# Patient Record
Sex: MALE | Race: ASIAN | Employment: FULL TIME | URBAN - METROPOLITAN AREA
[De-identification: names, ages, dates, MRNs, and addresses within clinical notes are randomized per-mention and may not be internally consistent; named-entity substitution may affect disease eponyms.]

---

## 2023-04-16 ENCOUNTER — HOSPITAL ENCOUNTER (OUTPATIENT)
Age: 53
Setting detail: OUTPATIENT SURGERY
Discharge: HOME | End: 2023-04-16
Attending: OPHTHALMOLOGY | Admitting: OPHTHALMOLOGY
Payer: COMMERCIAL

## 2023-04-16 VITALS
OXYGEN SATURATION: 97 % | RESPIRATION RATE: 16 BRPM | TEMPERATURE: 98.5 F | HEART RATE: 102 BPM | SYSTOLIC BLOOD PRESSURE: 157 MMHG | DIASTOLIC BLOOD PRESSURE: 81 MMHG | BODY MASS INDEX: 25.92 KG/M2 | WEIGHT: 175 LBS | HEIGHT: 69 IN

## 2023-04-16 PROCEDURE — 7100000010 HC PHASE II RECOVERY - FIRST 15 MIN: Performed by: OPHTHALMOLOGY

## 2023-04-16 PROCEDURE — 2580000003 HC RX 258: Performed by: ANESTHESIOLOGY

## 2023-04-16 PROCEDURE — 6370000000 HC RX 637 (ALT 250 FOR IP): Performed by: OPHTHALMOLOGY

## 2023-04-16 PROCEDURE — 3600000014 HC SURGERY LEVEL 4 ADDTL 15MIN: Performed by: OPHTHALMOLOGY

## 2023-04-16 PROCEDURE — 3600000004 HC SURGERY LEVEL 4 BASE: Performed by: OPHTHALMOLOGY

## 2023-04-16 PROCEDURE — 6360000002 HC RX W HCPCS: Performed by: ANESTHESIOLOGY

## 2023-04-16 PROCEDURE — 2720000010 HC SURG SUPPLY STERILE: Performed by: OPHTHALMOLOGY

## 2023-04-16 PROCEDURE — 7100000000 HC PACU RECOVERY - FIRST 15 MIN: Performed by: OPHTHALMOLOGY

## 2023-04-16 PROCEDURE — 3700000001 HC ADD 15 MINUTES (ANESTHESIA): Performed by: OPHTHALMOLOGY

## 2023-04-16 PROCEDURE — 7100000001 HC PACU RECOVERY - ADDTL 15 MIN: Performed by: OPHTHALMOLOGY

## 2023-04-16 PROCEDURE — 2500000003 HC RX 250 WO HCPCS: Performed by: OPHTHALMOLOGY

## 2023-04-16 PROCEDURE — 2709999900 HC NON-CHARGEABLE SUPPLY: Performed by: OPHTHALMOLOGY

## 2023-04-16 PROCEDURE — C1784 OCULAR DEV, INTRAOP, DET RET: HCPCS | Performed by: OPHTHALMOLOGY

## 2023-04-16 PROCEDURE — 7100000011 HC PHASE II RECOVERY - ADDTL 15 MIN: Performed by: OPHTHALMOLOGY

## 2023-04-16 PROCEDURE — 6360000002 HC RX W HCPCS: Performed by: OPHTHALMOLOGY

## 2023-04-16 PROCEDURE — 3700000000 HC ANESTHESIA ATTENDED CARE: Performed by: OPHTHALMOLOGY

## 2023-04-16 DEVICE — STRIP SCLER W3.5XL125MM THK0.75MM SIL SLD STYL 41/S2970: Type: IMPLANTABLE DEVICE | Site: EYE | Status: FUNCTIONAL

## 2023-04-16 RX ORDER — SODIUM CHLORIDE 9 MG/ML
INJECTION, SOLUTION INTRAVENOUS PRN
Status: DISCONTINUED | OUTPATIENT
Start: 2023-04-16 | End: 2023-04-16 | Stop reason: HOSPADM

## 2023-04-16 RX ORDER — PROCHLORPERAZINE EDISYLATE 5 MG/ML
5 INJECTION INTRAMUSCULAR; INTRAVENOUS
Status: DISCONTINUED | OUTPATIENT
Start: 2023-04-16 | End: 2023-04-16 | Stop reason: HOSPADM

## 2023-04-16 RX ORDER — SODIUM CHLORIDE 0.9 % (FLUSH) 0.9 %
5-40 SYRINGE (ML) INJECTION PRN
Status: DISCONTINUED | OUTPATIENT
Start: 2023-04-16 | End: 2023-04-16 | Stop reason: HOSPADM

## 2023-04-16 RX ORDER — PHENYLEPHRINE HCL 2.5 %
1 DROPS OPHTHALMIC (EYE)
Status: COMPLETED | OUTPATIENT
Start: 2023-04-16 | End: 2023-04-16

## 2023-04-16 RX ORDER — OXYCODONE HYDROCHLORIDE 5 MG/1
5 TABLET ORAL
Status: DISCONTINUED | OUTPATIENT
Start: 2023-04-16 | End: 2023-04-16 | Stop reason: HOSPADM

## 2023-04-16 RX ORDER — SODIUM CHLORIDE, SODIUM LACTATE, POTASSIUM CHLORIDE, CALCIUM CHLORIDE 600; 310; 30; 20 MG/100ML; MG/100ML; MG/100ML; MG/100ML
INJECTION, SOLUTION INTRAVENOUS CONTINUOUS
Status: DISCONTINUED | OUTPATIENT
Start: 2023-04-16 | End: 2023-04-16 | Stop reason: HOSPADM

## 2023-04-16 RX ORDER — LIDOCAINE HYDROCHLORIDE 10 MG/ML
1 INJECTION, SOLUTION INFILTRATION; PERINEURAL
Status: DISCONTINUED | OUTPATIENT
Start: 2023-04-16 | End: 2023-04-16 | Stop reason: HOSPADM

## 2023-04-16 RX ORDER — SODIUM CHLORIDE 0.9 % (FLUSH) 0.9 %
5-40 SYRINGE (ML) INJECTION EVERY 12 HOURS SCHEDULED
Status: DISCONTINUED | OUTPATIENT
Start: 2023-04-16 | End: 2023-04-16 | Stop reason: HOSPADM

## 2023-04-16 RX ORDER — MIDAZOLAM HYDROCHLORIDE 2 MG/2ML
2 INJECTION, SOLUTION INTRAMUSCULAR; INTRAVENOUS
Status: COMPLETED | OUTPATIENT
Start: 2023-04-16 | End: 2023-04-16

## 2023-04-16 RX ORDER — FENTANYL CITRATE 50 UG/ML
100 INJECTION, SOLUTION INTRAMUSCULAR; INTRAVENOUS
Status: DISCONTINUED | OUTPATIENT
Start: 2023-04-16 | End: 2023-04-16 | Stop reason: HOSPADM

## 2023-04-16 RX ORDER — BUPIVACAINE HYDROCHLORIDE 5 MG/ML
INJECTION, SOLUTION EPIDURAL; INTRACAUDAL PRN
Status: DISCONTINUED | OUTPATIENT
Start: 2023-04-16 | End: 2023-04-16 | Stop reason: HOSPADM

## 2023-04-16 RX ORDER — LIDOCAINE HYDROCHLORIDE 20 MG/ML
INJECTION, SOLUTION EPIDURAL; INFILTRATION; INTRACAUDAL; PERINEURAL PRN
Status: DISCONTINUED | OUTPATIENT
Start: 2023-04-16 | End: 2023-04-16 | Stop reason: HOSPADM

## 2023-04-16 RX ORDER — CYCLOPENTOLATE HYDROCHLORIDE 20 MG/ML
1 SOLUTION/ DROPS OPHTHALMIC
Status: COMPLETED | OUTPATIENT
Start: 2023-04-16 | End: 2023-04-16

## 2023-04-16 RX ORDER — HYDROMORPHONE HYDROCHLORIDE 2 MG/ML
0.5 INJECTION, SOLUTION INTRAMUSCULAR; INTRAVENOUS; SUBCUTANEOUS EVERY 5 MIN PRN
Status: DISCONTINUED | OUTPATIENT
Start: 2023-04-16 | End: 2023-04-16 | Stop reason: HOSPADM

## 2023-04-16 RX ORDER — CEFAZOLIN SODIUM 1 G/3ML
INJECTION, POWDER, FOR SOLUTION INTRAMUSCULAR; INTRAVENOUS PRN
Status: DISCONTINUED | OUTPATIENT
Start: 2023-04-16 | End: 2023-04-16 | Stop reason: HOSPADM

## 2023-04-16 RX ADMIN — PHENYLEPHRINE HYDROCHLORIDE 1 DROP: 25 SOLUTION/ DROPS OPHTHALMIC at 09:55

## 2023-04-16 RX ADMIN — MIDAZOLAM 2 MG: 1 INJECTION INTRAMUSCULAR; INTRAVENOUS at 10:00

## 2023-04-16 RX ADMIN — SODIUM CHLORIDE, SODIUM LACTATE, POTASSIUM CHLORIDE, AND CALCIUM CHLORIDE: 600; 310; 30; 20 INJECTION, SOLUTION INTRAVENOUS at 10:05

## 2023-04-16 RX ADMIN — CYCLOPENTOLATE HYDROCHLORIDE 1 DROP: 20 SOLUTION/ DROPS OPHTHALMIC at 09:50

## 2023-04-16 RX ADMIN — PHENYLEPHRINE HYDROCHLORIDE 1 DROP: 25 SOLUTION/ DROPS OPHTHALMIC at 10:00

## 2023-04-16 RX ADMIN — PHENYLEPHRINE HYDROCHLORIDE 1 DROP: 25 SOLUTION/ DROPS OPHTHALMIC at 09:57

## 2023-04-16 RX ADMIN — CYCLOPENTOLATE HYDROCHLORIDE 1 DROP: 20 SOLUTION/ DROPS OPHTHALMIC at 10:00

## 2023-04-16 RX ADMIN — CYCLOPENTOLATE HYDROCHLORIDE 1 DROP: 20 SOLUTION/ DROPS OPHTHALMIC at 09:55

## 2023-04-16 ASSESSMENT — PAIN - FUNCTIONAL ASSESSMENT
PAIN_FUNCTIONAL_ASSESSMENT: 0-10
PAIN_FUNCTIONAL_ASSESSMENT: NONE - DENIES PAIN
PAIN_FUNCTIONAL_ASSESSMENT: NONE - DENIES PAIN

## 2023-04-17 NOTE — OP NOTE
[address] [facility]  OPERATIVE REPORT    Name:  Trena Orosco  MR#:  [id number]  :  1970  ACCOUNT #:  [de-identified]  DATE OF SERVICE:  2023    PREOPERATIVE DIAGNOSES:  1. Rhegmatogenous retinal detachment, macula on, right eye; multiple defects. 2.  Posterior vitreous detachment. 3.  Lattice degeneration. POSTOPERATIVE DIAGNOSES:  1. Rhegmatogenous retinal detachment, macula on, right eye; multiple defects. 2.  Posterior vitreous detachment. 3.  Lattice degeneration. PROCEDURE PERFORMED:  1.  Pars plana vitrectomy and scleral buckling #41 band with 70 sleeve to repair retinal detachment. 2.  Fluid-gas exchange with 20% SF6.  3.  Laser total of 496 spots. SURGEON:  Jose Alejandro Crespo MD    ASSISTANT:  No assistants. ANESTHESIA:  General with block. COMPLICATIONS:  No complications. SPECIMENS REMOVED:  There were no specimens. IMPLANTS:  He had a 41 band with 70 sleeve. ESTIMATED BLOOD LOSS:  No blood loss. INDICATIONS FOR PROCEDURE:  The patient has significant vision loss associated with macula-on retinal detachment. He noted *** in his vision for approximately last week. He had not seen progression of it, but had two breaks with fairly mobile detachment. Discussed his options, risks and benefits, and I recommended vitrectomy to repair combined scleral buckling as there were fairly posterior breaks and lattice degeneration present approximately 9 o'clock and at 6:30. I felt that these would be unlikely to stay attached with a bubble alone and that scleral buckling would be somewhat complicated by the posterior nature of the breaks. Discussed his options, risks and benefits, the patient elected to proceed. OPERATION:  After adequate preoperative evaluation and informed consent had been obtained, the patient was brought into the operating suite. IV access and general laryngotracheal mask anesthetic was achieved.   The right eye was verified as the operative

## (undated) DEVICE — BETADINE 5% EYE SOL

## (undated) DEVICE — SYRINGE MED 10ML LUERLOCK TIP W/O SFTY DISP

## (undated) DEVICE — SOLUTION IRRIGATION BAL SALT SOLUTION 500 ML STRL BSS

## (undated) DEVICE — 3M™ TEGADERM™ TRANSPARENT FILM DRESSING FRAME STYLE, 1628, 6 IN X 8 IN (15 CM X 20 CM), 10/CT 8CT/CASE: Brand: 3M™ TEGADERM™

## (undated) DEVICE — SOLUTION IRRIGATION BAL SALT SOLUTION 500 ML BTL 6/CA BSS +

## (undated) DEVICE — NEEDLE HYPO 27GA L1.25IN GRY POLYPR HUB S STL REG BVL STR

## (undated) DEVICE — CONSTELLATION VISION SYSTEM 7500 CPM ULTRAVIT PROBE STRAIGHT ENDOILLUMINATOR 25+ TOTALPLUS VITRECTOMY PAK EDGEPLUS BLADE VALVED ENTRY SYSTEM: Brand: CONSTELLATION, ULTRAVIT, 25+, TOTALPLUS, EDGEPLUS

## (undated) DEVICE — ADMINISTRATION SET IV SINGLE FLUID 80 IN IRRIGATION KT

## (undated) DEVICE — GLOVE SURG SZ 85 CRM LTX FREE POLYISOPRENE POLYMER BEAD ANTI

## (undated) DEVICE — DIATHERMY PROBE DSP, 25G: Brand: ALCON GRIESHABER

## (undated) DEVICE — DISPOSABLE BIPOLAR CODE, 12' (3.66 M): Brand: CONMED

## (undated) DEVICE — SURGICAL PROCEDURE PACK EYE CDS